# Patient Record
Sex: FEMALE | Race: WHITE
[De-identification: names, ages, dates, MRNs, and addresses within clinical notes are randomized per-mention and may not be internally consistent; named-entity substitution may affect disease eponyms.]

---

## 2018-07-11 ENCOUNTER — HOSPITAL ENCOUNTER (EMERGENCY)
Dept: HOSPITAL 25 - UCEAST | Age: 73
Discharge: HOME | End: 2018-07-11
Payer: MEDICARE

## 2018-07-11 VITALS — DIASTOLIC BLOOD PRESSURE: 71 MMHG | SYSTOLIC BLOOD PRESSURE: 127 MMHG
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
## 2018-07-11 DIAGNOSIS — S62.663A: Primary | ICD-10-CM

## 2018-07-11 PROCEDURE — G0463 HOSPITAL OUTPT CLINIC VISIT: HCPCS

## 2018-07-11 PROCEDURE — 73140 X-RAY EXAM OF FINGER(S): CPT

## 2018-07-11 PROCEDURE — 99201: CPT

## 2018-07-11 NOTE — RAD
Indication: Crush injury LEFT third finger. Pain, bruising, swelling.



Comparison: No relevant prior exams available on the [facility] PACS for comparison.



Technique: 3 views LEFT third finger.



REPORT AND IMPRESSION: 

#.   Nondisplaced fracture tuft of distal phalanx with overlying soft tissue swelling.

Negative for dislocation. 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

joint.

## 2018-07-11 NOTE — UC
Hand/Wrist HPI





- HPI Summary


HPI Summary: 


CLOSED LEFT 3RD FINGER IN A DOOR LAST NIGHT. HAS PAIN AND SWELLING DISTALLY. 





- History Of Current Complaint


Chief Complaint: UCUpperExtremity


Stated Complaint: FINGER INJURY


Time Seen by Provider: 07/11/18 17:51


Hx Obtained From: Patient


Hx Last Menstrual Period: na


Onset/Duration: Sudden Onset, Lasting Hours, Still Present


Severity Initially: Moderate


Severity Currently: Moderate


Pain Intensity: 5


Pain Scale Used: 0-10 Numeric


Character Of Pain: Aching, Throbbing


Aggravating Factor(s): Movement


Alleviating Factor(s): Rest


Associated Signs And Symptoms: Positive: Swelling, Bruising


Related History: Dominant Hand Right





- Allergies/Home Medications


Allergies/Adverse Reactions: 


 Allergies











Allergy/AdvReac Type Severity Reaction Status Date / Time


 


No Known Allergies Allergy   Verified 07/11/18 17:21











Home Medications: 


 Home Medications





Metoprolol Succinate mg PO BID 07/11/18 [History]


Rivaroxaban TAB(*) [Xarelto 20 mg] 20 mg PO DAILY 07/11/18 [History Confirmed 07 /11/18]











PMH/Surg Hx/FS Hx/Imm Hx


Cardiovascular History: Atrial Fibrillation





- Surgical History


Surgical History: None


Surgery Procedure, Year, and Place: cataracts





- Family History


Known Family History: 


   Negative: Hypertension





- Social History


Alcohol Use: None


Substance Use Type: None


Smoking Status (MU): Never Smoked Tobacco





Review of Systems


Constitutional: Negative


Skin: Bruising


Respiratory: Negative


Cardiovascular: Negative


Gastrointestinal: Negative


Musculoskeletal: Arthralgia, Decreased ROM, Edema


All Other Systems Reviewed And Are Negative: Yes





Physical Exam


Triage Information Reviewed: Yes


Appearance: Well-Appearing, No Pain Distress, Well-Nourished


Vital Signs: 


 Initial Vital Signs











Temp  97.3 F   07/11/18 17:17


 


Pulse  96   07/11/18 17:17


 


Resp  18   07/11/18 17:17


 


BP  127/71   07/11/18 17:17


 


Pulse Ox  98   07/11/18 17:17











Vital Signs Reviewed: Yes


Eyes: Positive: Conjunctiva Clear


ENT: Positive: Hearing grossly normal


Neck: Positive: Supple


Respiratory: Positive: No respiratory distress, No accessory muscle use


Cardiovascular: Positive: Pulses Normal


Abdomen Description: Positive: Soft


Musculoskeletal: Positive: ROM Limited @ - LEFT 3RD FINGER, Edema @ - DISTAL 

LEFT 3RD FINGER


Neurological: Positive: Alert


Psychological: Positive: Age Appropriate Behavior


Skin: Positive: Other - BRUISING DISTAL LEFT 3RD FINGER. SMALL SUBUNGUAL 

HEMATOMA





Diagnostics





- Radiology


  ** LEFT 3RD FINGER XRAY


Xray Interpretation: Positive (See Comments) - Nondisplaced fracture tuft of 

distal phalanx with overlying soft tissue swelling. Negative for dislocation. 

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


Radiology Interpretation Completed By: Radiologist





Hand/Wrist Course/Dx





- Course


Course Of Treatment: PT DECLINES SPLINT. ADVISED ORTHO F/U. TYLENOL FOR 

DISCOMFORT





- Differential Dx/Diagnosis


Provider Diagnoses: Nondisplaced fracture tuft of distal phalanx - LEFT 3RD 

FINGER





Discharge





- Sign-Out/Discharge


Documenting (check all that apply): Patient Departure





- Discharge Plan


Condition: Stable


Disposition: HOME


Patient Education Materials:  Finger Fracture (ED)


Referrals: 


Darnell León MD [Primary Care Provider] - If Needed


Nargis Gonzalez MD [Medical Doctor] - 2 Weeks


Additional Instructions: 


Your x-ray today is positive for a tuft fracture of the left third finger.  

Protect it from further trauma.  Follow-up with your PCP or orthopedics in the 

next 1-2 weeks.  OTC Tylenol as needed for discomfort.





- Billing Disposition and Condition


Condition: STABLE


Disposition: Home

## 2019-11-06 ENCOUNTER — HOSPITAL ENCOUNTER (OUTPATIENT)
Dept: HOSPITAL 25 - OR | Age: 74
Discharge: HOME | End: 2019-11-06
Attending: SURGERY
Payer: MEDICARE

## 2019-11-06 VITALS — DIASTOLIC BLOOD PRESSURE: 79 MMHG | SYSTOLIC BLOOD PRESSURE: 140 MMHG

## 2019-11-06 DIAGNOSIS — I48.91: ICD-10-CM

## 2019-11-06 DIAGNOSIS — I73.9: ICD-10-CM

## 2019-11-06 DIAGNOSIS — M19.90: ICD-10-CM

## 2019-11-06 DIAGNOSIS — Z79.01: ICD-10-CM

## 2019-11-06 DIAGNOSIS — K40.90: Primary | ICD-10-CM

## 2019-11-06 PROCEDURE — 88302 TISSUE EXAM BY PATHOLOGIST: CPT

## 2019-11-06 PROCEDURE — C1781 MESH (IMPLANTABLE): HCPCS

## 2019-11-06 NOTE — OP
CC:  Dr. Darnell León; [facility] *

 

DATE OF OPERATION:  11/06/19 - [facility]

 

DATE OF BIRTH:  [birth year]

 

SURGEON:  Issac Horne MD

 

ASSISTANT:  Priyanka Sebastian NP

 

ANESTHESIOLOGIST:  Dr. Eid.

 

ANESTHESIA:  Local MAC anesthesia.

 

PRE-OP DIAGNOSIS:  Left inguinal hernia.

 

POST-OP DIAGNOSIS:  Left inguinal hernia.

 

OPERATIVE PROCEDURE:  Open left inguinal hernia repair with mesh.

 

ESTIMATED BLOOD LOSS:  Minimal.

 

FLUIDS:  Minimal crystalloid fluid given.

 

SPECIMEN:  Hernia sac.

 

COUNTS:  Lap pad count and instrument count correct at the end of the procedure.

 

DRAINS:  None.

 

INDICATIONS:  The patient was identified in the preoperative area.  She was 
marked appropriately and consent was signed.

 

DESCRIPTION OF PROCEDURE:  She was taken to the operating room and placed on 
the operating table in supine position.  Preoperative antibiotics were given. 
Sequential devices were placed on bilateral lower extremities.  General 
sedation was given.  The patient's left groin was prepped and draped in the 
standard surgical fashion.  A time-out was performed.

 

An inguinal incision was made.  This was deepened down through the Jordana's and 
Camper fascia right down to the external oblique aponeurosis.  This was cleaned 
off and incised and flaps were made both cephalad and caudad and the structures 
isolated, which included hernia sac and round ligament.  The hernia sac was 
dissected free from ligament.  This was ligated and it retracted back into the 
internal ring.

 

Next, a medium sized left-sided ProGrip mesh was utilized.  This was placed 
appropriately into the space.  It should be noted that after looking it, there 
was no evidence of any femoral hernia and this was all an indirect hernia.  
However, the mesh was placed in the appropriate fashion covering both direct, 
indirect and femoral spaces. It was tacked to the pubic tubercle.  The other 
spots had adhered down to the floor of the inguinal canal.  We then removed the 
Penrose drain that was around the round ligament.  I did not ligate this as I 
felt we had a good repair.  Ilioinguinal nerve superiorly had been isolated and 
we did not ligate this as well to stay above the mesh.

 

The aponeurosis external oblique was reapproximated with a running 2-0 Vicryl 
suture and then we closed the skin incision with 3-0 Vicryl at the Jordana 
fascia and subcutaneous followed by a 4-0 Monocryl subcuticular sutures.  Steri-
Strips and sterile dressing were applied.  The patient tolerated the procedure 
well, was transferred to the PACU in stable condition.

 

 [id number]/[facility] #: [id number]

ANALI